# Patient Record
Sex: MALE | Race: WHITE | HISPANIC OR LATINO | Employment: UNEMPLOYED | URBAN - METROPOLITAN AREA
[De-identification: names, ages, dates, MRNs, and addresses within clinical notes are randomized per-mention and may not be internally consistent; named-entity substitution may affect disease eponyms.]

---

## 2020-08-26 ENCOUNTER — HOSPITAL ENCOUNTER (EMERGENCY)
Facility: HOSPITAL | Age: 25
Discharge: HOME | End: 2020-08-26
Attending: EMERGENCY MEDICINE
Payer: MEDICAID

## 2020-08-26 VITALS
RESPIRATION RATE: 19 BRPM | TEMPERATURE: 98 F | BODY MASS INDEX: 29.99 KG/M2 | HEART RATE: 75 BPM | DIASTOLIC BLOOD PRESSURE: 69 MMHG | WEIGHT: 180 LBS | SYSTOLIC BLOOD PRESSURE: 117 MMHG | HEIGHT: 65 IN | OXYGEN SATURATION: 100 %

## 2020-08-26 DIAGNOSIS — Z91.038 ALLERGIC REACTION TO INSECT BITE: Primary | ICD-10-CM

## 2020-08-26 PROCEDURE — 96375 TX/PRO/DX INJ NEW DRUG ADDON: CPT

## 2020-08-26 PROCEDURE — S0028 INJECTION, FAMOTIDINE, 20 MG: HCPCS | Performed by: EMERGENCY MEDICINE

## 2020-08-26 PROCEDURE — 63600175 PHARM REV CODE 636 W HCPCS: Performed by: EMERGENCY MEDICINE

## 2020-08-26 PROCEDURE — 99284 EMERGENCY DEPT VISIT MOD MDM: CPT | Mod: 25

## 2020-08-26 PROCEDURE — 25000003 PHARM REV CODE 250: Performed by: EMERGENCY MEDICINE

## 2020-08-26 PROCEDURE — 96374 THER/PROPH/DIAG INJ IV PUSH: CPT

## 2020-08-26 RX ORDER — CETIRIZINE HYDROCHLORIDE 10 MG/1
10 TABLET ORAL DAILY
Qty: 10 TABLET | Refills: 0 | Status: ON HOLD | OUTPATIENT
Start: 2020-08-26 | End: 2023-04-07 | Stop reason: HOSPADM

## 2020-08-26 RX ORDER — PREDNISONE 20 MG/1
40 TABLET ORAL DAILY
Qty: 6 TABLET | Refills: 0 | Status: ON HOLD | OUTPATIENT
Start: 2020-08-26 | End: 2023-04-07 | Stop reason: HOSPADM

## 2020-08-26 RX ORDER — DEXAMETHASONE SODIUM PHOSPHATE 4 MG/ML
8 INJECTION, SOLUTION INTRA-ARTICULAR; INTRALESIONAL; INTRAMUSCULAR; INTRAVENOUS; SOFT TISSUE
Status: COMPLETED | OUTPATIENT
Start: 2020-08-26 | End: 2020-08-26

## 2020-08-26 RX ORDER — FAMOTIDINE 10 MG/ML
20 INJECTION INTRAVENOUS ONCE
Status: COMPLETED | OUTPATIENT
Start: 2020-08-26 | End: 2020-08-26

## 2020-08-26 RX ADMIN — FAMOTIDINE 20 MG: 10 INJECTION, SOLUTION INTRAVENOUS at 09:08

## 2020-08-26 RX ADMIN — DEXAMETHASONE SODIUM PHOSPHATE 8 MG: 4 INJECTION, SOLUTION INTRAMUSCULAR; INTRAVENOUS at 08:08

## 2020-08-26 NOTE — ED NOTES
Patient stated that he was bite by ants around 8am and started to have reaction to ant bite. Patient stated that he noticied ant bites on his left arm and his left arm started to swell, with itching, and then his face and neck started to swell. Patient denies difficulty breathing with episode. Patient was driven to [employer] and took 50mg po of benadryl around 8:15 and then came here. Patient presents with no difficulty swallowing, no voice change and stated that he is starting to feel better. Patient denies itching at present time. Patient changed into gown

## 2020-08-26 NOTE — ED PROVIDER NOTES
Encounter Date: 8/26/2020       History     Chief Complaint   Patient presents with    Insect Bite     Pt presents to ED today c/o ants bites to hands, followed by vomiting, swelling to arms and itching to upper body. pt reports taking benadryl PTA. Pt denies feeling SOB or having difficulty swallowing. airway patent.      Patient is a 24-year-old male who said he was bitten by ants to his left arm this morning.  He began to notice his arm swelling which progressed on to his chest.  He also had an episode of vomiting.  He denies any difficulty breathing or swallowing.  Patient took 50 mg of Benadryl prior to arrival to the ED.        Review of patient's allergies indicates:  No Known Allergies  History reviewed. No pertinent past medical history.  No past surgical history on file.  History reviewed. No pertinent family history.  Social History     Tobacco Use    Smoking status: Not on file   Substance Use Topics    Alcohol use: Not on file    Drug use: Not on file     Review of Systems   Constitutional: Negative for fever.   Respiratory: Negative for cough and shortness of breath.    Cardiovascular: Negative for chest pain.   Gastrointestinal: Positive for nausea and vomiting.   Skin: Positive for color change.   All other systems reviewed and are negative.      Physical Exam     Initial Vitals   BP Pulse Resp Temp SpO2   08/26/20 0824 08/26/20 0823 08/26/20 0824 08/26/20 0820 08/26/20 0823   (!) 126/58 90 19 98 °F (36.7 °C) 95 %      MAP       --                Physical Exam    Nursing note and vitals reviewed.  Constitutional: No distress.   HENT:   Head: Atraumatic.   Eyes: EOM are normal. Pupils are equal, round, and reactive to light.   Neck: Neck supple.   Cardiovascular: Normal rate, regular rhythm and normal heart sounds.   Pulmonary/Chest: Breath sounds normal.   Abdominal: Soft. There is no abdominal tenderness.   Neurological: He is alert and oriented to person, place, and time.   Skin: Skin is warm  and dry.   There is diffuse erythema of the left arm extending to the upper chest and neck.  No visible pustules or vesicles.         ED Course   Procedures  Labs Reviewed - No data to display       Imaging Results    None          Medical Decision Making:   ED Management:  24-year-old male with an allergic reaction to antibiotics.  Patient was given 8 mg of Decadron here in the ED as well as 20 mg of Pepcid.  He has no respiratory compromise.  I will discharge him with prescriptions for Zyrtec and prednisone.  He will return if condition worsens otherwise try to follow-up with the primary physician when able.                                 Clinical Impression:       ICD-10-CM ICD-9-CM   1. Allergic reaction to insect bite  Z91.038 V15.06                                Hiram Pitts MD  08/26/20 1007

## 2022-03-29 ENCOUNTER — LAB VISIT (OUTPATIENT)
Dept: PRIMARY CARE CLINIC | Facility: OTHER | Age: 27
End: 2022-03-29
Payer: MEDICAID

## 2022-03-29 DIAGNOSIS — Z20.822 ENCOUNTER FOR LABORATORY TESTING FOR COVID-19 VIRUS: ICD-10-CM

## 2022-03-29 LAB
SARS-COV-2 RNA RESP QL NAA+PROBE: NOT DETECTED
SARS-COV-2- CYCLE NUMBER: NORMAL

## 2022-03-29 PROCEDURE — U0003 INFECTIOUS AGENT DETECTION BY NUCLEIC ACID (DNA OR RNA); SEVERE ACUTE RESPIRATORY SYNDROME CORONAVIRUS 2 (SARS-COV-2) (CORONAVIRUS DISEASE [COVID-19]), AMPLIFIED PROBE TECHNIQUE, MAKING USE OF HIGH THROUGHPUT TECHNOLOGIES AS DESCRIBED BY CMS-2020-01-R: HCPCS | Performed by: INTERNAL MEDICINE

## 2023-03-31 ENCOUNTER — HOSPITAL ENCOUNTER (EMERGENCY)
Facility: HOSPITAL | Age: 28
Discharge: PSYCHIATRIC OR SUBSTANCE USE FACILITY | End: 2023-03-31
Attending: EMERGENCY MEDICINE
Payer: MEDICAID

## 2023-03-31 VITALS
SYSTOLIC BLOOD PRESSURE: 149 MMHG | WEIGHT: 136 LBS | HEART RATE: 99 BPM | RESPIRATION RATE: 18 BRPM | DIASTOLIC BLOOD PRESSURE: 70 MMHG | BODY MASS INDEX: 22.66 KG/M2 | TEMPERATURE: 98 F | HEIGHT: 65 IN | OXYGEN SATURATION: 99 %

## 2023-03-31 DIAGNOSIS — R45.1 AGITATION: ICD-10-CM

## 2023-03-31 DIAGNOSIS — R45.851 SUICIDAL IDEATION: Primary | ICD-10-CM

## 2023-03-31 DIAGNOSIS — F15.10 AMPHETAMINE ABUSE: ICD-10-CM

## 2023-03-31 DIAGNOSIS — N34.2 URETHRITIS: ICD-10-CM

## 2023-03-31 DIAGNOSIS — F22 PARANOIA: ICD-10-CM

## 2023-03-31 LAB
ALBUMIN SERPL BCP-MCNC: 4.7 G/DL (ref 3.5–5.2)
ALP SERPL-CCNC: 90 U/L (ref 55–135)
ALT SERPL W/O P-5'-P-CCNC: 33 U/L (ref 10–44)
AMPHET+METHAMPHET UR QL: ABNORMAL
ANION GAP SERPL CALC-SCNC: 8 MMOL/L (ref 8–16)
APAP SERPL-MCNC: <3 UG/ML (ref 10–20)
AST SERPL-CCNC: 25 U/L (ref 10–40)
BACTERIA #/AREA URNS HPF: ABNORMAL /HPF
BARBITURATES UR QL SCN>200 NG/ML: NEGATIVE
BASOPHILS # BLD AUTO: 0.04 K/UL (ref 0–0.2)
BASOPHILS NFR BLD: 0.6 % (ref 0–1.9)
BENZODIAZ UR QL SCN>200 NG/ML: NEGATIVE
BILIRUB SERPL-MCNC: 0.4 MG/DL (ref 0.1–1)
BILIRUB UR QL STRIP: NEGATIVE
BUN SERPL-MCNC: 16 MG/DL (ref 6–20)
BZE UR QL SCN: NEGATIVE
CALCIUM SERPL-MCNC: 9.9 MG/DL (ref 8.7–10.5)
CANNABINOIDS UR QL SCN: NEGATIVE
CHLORIDE SERPL-SCNC: 107 MMOL/L (ref 95–110)
CLARITY UR: CLEAR
CO2 SERPL-SCNC: 22 MMOL/L (ref 23–29)
COLOR UR: YELLOW
CREAT SERPL-MCNC: 1 MG/DL (ref 0.5–1.4)
CREAT UR-MCNC: 305.2 MG/DL (ref 23–375)
DIFFERENTIAL METHOD: ABNORMAL
EOSINOPHIL # BLD AUTO: 0.1 K/UL (ref 0–0.5)
EOSINOPHIL NFR BLD: 0.9 % (ref 0–8)
ERYTHROCYTE [DISTWIDTH] IN BLOOD BY AUTOMATED COUNT: 12.3 % (ref 11.5–14.5)
EST. GFR  (NO RACE VARIABLE): >60 ML/MIN/1.73 M^2
ETHANOL SERPL-MCNC: <10 MG/DL
GLUCOSE SERPL-MCNC: 93 MG/DL (ref 70–110)
GLUCOSE UR QL STRIP: NEGATIVE
HCT VFR BLD AUTO: 46.8 % (ref 40–54)
HGB BLD-MCNC: 16.1 G/DL (ref 14–18)
HGB UR QL STRIP: ABNORMAL
HYALINE CASTS #/AREA URNS LPF: 5 /LPF
IMM GRANULOCYTES # BLD AUTO: 0.01 K/UL (ref 0–0.04)
IMM GRANULOCYTES NFR BLD AUTO: 0.1 % (ref 0–0.5)
KETONES UR QL STRIP: NEGATIVE
LEUKOCYTE ESTERASE UR QL STRIP: ABNORMAL
LYMPHOCYTES # BLD AUTO: 1.8 K/UL (ref 1–4.8)
LYMPHOCYTES NFR BLD: 25.3 % (ref 18–48)
MCH RBC QN AUTO: 29.5 PG (ref 27–31)
MCHC RBC AUTO-ENTMCNC: 34.4 G/DL (ref 32–36)
MCV RBC AUTO: 86 FL (ref 82–98)
METHADONE UR QL SCN>300 NG/ML: NEGATIVE
MICROSCOPIC COMMENT: ABNORMAL
MONOCYTES # BLD AUTO: 0.5 K/UL (ref 0.3–1)
MONOCYTES NFR BLD: 7.2 % (ref 4–15)
NEUTROPHILS # BLD AUTO: 4.6 K/UL (ref 1.8–7.7)
NEUTROPHILS NFR BLD: 65.9 % (ref 38–73)
NITRITE UR QL STRIP: NEGATIVE
NRBC BLD-RTO: 0 /100 WBC
OPIATES UR QL SCN: NEGATIVE
PCP UR QL SCN>25 NG/ML: NEGATIVE
PH UR STRIP: 6 [PH] (ref 5–8)
PLATELET # BLD AUTO: 330 K/UL (ref 150–450)
PMV BLD AUTO: 8.9 FL (ref 9.2–12.9)
POTASSIUM SERPL-SCNC: 4.2 MMOL/L (ref 3.5–5.1)
PROT SERPL-MCNC: 8.7 G/DL (ref 6–8.4)
PROT UR QL STRIP: ABNORMAL
RBC # BLD AUTO: 5.46 M/UL (ref 4.6–6.2)
RBC #/AREA URNS HPF: 6 /HPF (ref 0–4)
SODIUM SERPL-SCNC: 137 MMOL/L (ref 136–145)
SP GR UR STRIP: >1.03 (ref 1–1.03)
TOXICOLOGY INFORMATION: ABNORMAL
TSH SERPL DL<=0.005 MIU/L-ACNC: 3.02 UIU/ML (ref 0.4–4)
URN SPEC COLLECT METH UR: ABNORMAL
UROBILINOGEN UR STRIP-ACNC: ABNORMAL EU/DL
WBC # BLD AUTO: 6.91 K/UL (ref 3.9–12.7)
WBC #/AREA URNS HPF: 26 /HPF (ref 0–5)
WBC CLUMPS URNS QL MICRO: ABNORMAL

## 2023-03-31 PROCEDURE — 84443 ASSAY THYROID STIM HORMONE: CPT | Performed by: EMERGENCY MEDICINE

## 2023-03-31 PROCEDURE — 87591 N.GONORRHOEAE DNA AMP PROB: CPT | Performed by: EMERGENCY MEDICINE

## 2023-03-31 PROCEDURE — 96372 THER/PROPH/DIAG INJ SC/IM: CPT | Performed by: EMERGENCY MEDICINE

## 2023-03-31 PROCEDURE — 63600175 PHARM REV CODE 636 W HCPCS: Performed by: EMERGENCY MEDICINE

## 2023-03-31 PROCEDURE — 87086 URINE CULTURE/COLONY COUNT: CPT | Performed by: EMERGENCY MEDICINE

## 2023-03-31 PROCEDURE — 82077 ASSAY SPEC XCP UR&BREATH IA: CPT | Performed by: EMERGENCY MEDICINE

## 2023-03-31 PROCEDURE — 63600175 PHARM REV CODE 636 W HCPCS

## 2023-03-31 PROCEDURE — 25000003 PHARM REV CODE 250: Performed by: EMERGENCY MEDICINE

## 2023-03-31 PROCEDURE — 63700000 PHARM REV CODE 250 ALT 637 W/O HCPCS: Performed by: EMERGENCY MEDICINE

## 2023-03-31 PROCEDURE — 99285 EMERGENCY DEPT VISIT HI MDM: CPT | Mod: 25

## 2023-03-31 PROCEDURE — 81000 URINALYSIS NONAUTO W/SCOPE: CPT | Mod: 59 | Performed by: EMERGENCY MEDICINE

## 2023-03-31 PROCEDURE — 80307 DRUG TEST PRSMV CHEM ANLYZR: CPT | Performed by: EMERGENCY MEDICINE

## 2023-03-31 PROCEDURE — 80143 DRUG ASSAY ACETAMINOPHEN: CPT | Performed by: EMERGENCY MEDICINE

## 2023-03-31 PROCEDURE — 80053 COMPREHEN METABOLIC PANEL: CPT | Performed by: EMERGENCY MEDICINE

## 2023-03-31 PROCEDURE — 85025 COMPLETE CBC W/AUTO DIFF WBC: CPT | Performed by: EMERGENCY MEDICINE

## 2023-03-31 PROCEDURE — 96372 THER/PROPH/DIAG INJ SC/IM: CPT

## 2023-03-31 RX ORDER — LORAZEPAM 2 MG/ML
2 INJECTION INTRAMUSCULAR
Status: DISCONTINUED | OUTPATIENT
Start: 2023-03-31 | End: 2023-03-31

## 2023-03-31 RX ORDER — DIPHENHYDRAMINE HYDROCHLORIDE 50 MG/ML
INJECTION INTRAMUSCULAR; INTRAVENOUS
Status: COMPLETED
Start: 2023-03-31 | End: 2023-03-31

## 2023-03-31 RX ORDER — LORAZEPAM 0.5 MG/1
1 TABLET ORAL EVERY 4 HOURS PRN
Status: DISCONTINUED | OUTPATIENT
Start: 2023-03-31 | End: 2023-03-31 | Stop reason: HOSPADM

## 2023-03-31 RX ORDER — CEPHALEXIN 500 MG/1
500 CAPSULE ORAL EVERY 12 HOURS
Qty: 10 CAPSULE | Refills: 0 | Status: ON HOLD | OUTPATIENT
Start: 2023-03-31 | End: 2023-04-07 | Stop reason: HOSPADM

## 2023-03-31 RX ORDER — HALOPERIDOL 5 MG/ML
INJECTION INTRAMUSCULAR
Status: COMPLETED
Start: 2023-03-31 | End: 2023-03-31

## 2023-03-31 RX ORDER — CEFTRIAXONE 500 MG/1
500 INJECTION, POWDER, FOR SOLUTION INTRAMUSCULAR; INTRAVENOUS
Status: COMPLETED | OUTPATIENT
Start: 2023-03-31 | End: 2023-03-31

## 2023-03-31 RX ORDER — AZITHROMYCIN 250 MG/1
1000 TABLET, FILM COATED ORAL
Status: COMPLETED | OUTPATIENT
Start: 2023-03-31 | End: 2023-03-31

## 2023-03-31 RX ORDER — OLANZAPINE 5 MG/1
10 TABLET, ORALLY DISINTEGRATING ORAL
Status: COMPLETED | OUTPATIENT
Start: 2023-03-31 | End: 2023-03-31

## 2023-03-31 RX ORDER — LORAZEPAM 2 MG/ML
INJECTION INTRAMUSCULAR
Status: DISCONTINUED
Start: 2023-03-31 | End: 2023-03-31 | Stop reason: HOSPADM

## 2023-03-31 RX ORDER — LORAZEPAM 2 MG/ML
2 INJECTION INTRAMUSCULAR EVERY 4 HOURS PRN
Status: DISCONTINUED | OUTPATIENT
Start: 2023-03-31 | End: 2023-03-31

## 2023-03-31 RX ORDER — LORAZEPAM 2 MG/ML
2 INJECTION INTRAMUSCULAR
Status: COMPLETED | OUTPATIENT
Start: 2023-03-31 | End: 2023-03-31

## 2023-03-31 RX ORDER — HALOPERIDOL 5 MG/ML
5 INJECTION INTRAMUSCULAR
Status: COMPLETED | OUTPATIENT
Start: 2023-03-31 | End: 2023-03-31

## 2023-03-31 RX ORDER — DIPHENHYDRAMINE HYDROCHLORIDE 50 MG/ML
50 INJECTION INTRAMUSCULAR; INTRAVENOUS
Status: COMPLETED | OUTPATIENT
Start: 2023-03-31 | End: 2023-03-31

## 2023-03-31 RX ORDER — LORAZEPAM 2 MG/ML
2 INJECTION INTRAMUSCULAR
Status: DISCONTINUED | OUTPATIENT
Start: 2023-03-31 | End: 2023-03-31 | Stop reason: HOSPADM

## 2023-03-31 RX ADMIN — DIPHENHYDRAMINE HYDROCHLORIDE 50 MG: 50 INJECTION INTRAMUSCULAR; INTRAVENOUS at 04:03

## 2023-03-31 RX ADMIN — HALOPERIDOL LACTATE 5 MG: 5 INJECTION, SOLUTION INTRAMUSCULAR at 04:03

## 2023-03-31 RX ADMIN — HALOPERIDOL 5 MG: 5 INJECTION INTRAMUSCULAR at 04:03

## 2023-03-31 RX ADMIN — CEFTRIAXONE SODIUM 500 MG: 500 INJECTION, POWDER, FOR SOLUTION INTRAMUSCULAR; INTRAVENOUS at 09:03

## 2023-03-31 RX ADMIN — AZITHROMYCIN MONOHYDRATE 1000 MG: 250 TABLET ORAL at 09:03

## 2023-03-31 RX ADMIN — OLANZAPINE 10 MG: 5 TABLET, ORALLY DISINTEGRATING ORAL at 09:03

## 2023-03-31 RX ADMIN — DIPHENHYDRAMINE HYDROCHLORIDE 50 MG: 50 INJECTION, SOLUTION INTRAMUSCULAR; INTRAVENOUS at 04:03

## 2023-03-31 RX ADMIN — LORAZEPAM 2 MG: 2 INJECTION INTRAMUSCULAR; INTRAVENOUS at 04:03

## 2023-03-31 NOTE — ED NOTES
Unsuccessful attempt  to put pt in restraints. Pt up, yelling and banging on door. Security at bedside.

## 2023-03-31 NOTE — ED NOTES
Patient escorted out by security and tech to transported via [facility] to be transferred to [facility].  Patient is accompanied by his personal belongings bag.

## 2023-03-31 NOTE — ED NOTES
Security at BS. Pt complaining but is being compliant. Pt took meds, is in blue scrubs. Pt notified he will be going to [facility]. When asked if he wanted anyone to be notified pt mumbled and fell back asleep.

## 2023-03-31 NOTE — ED PROVIDER NOTES
"Encounter Date: 3/31/2023    SCRIBE #1 NOTE: I, Jair Palacio, am scribing for, and in the presence of,  Kp Camargo MD. I have scribed the following portions of the note - Other sections scribed: HPI, ROS, PE, procedure note.     History     Chief Complaint   Patient presents with    Psychiatric Evaluation     Pt presents to the ED via police. States he was having an argument with his girlfriend when she called the police and told them he wanted to kill himself. Pt is adamant that he has no SI or HI.      Abdirahman Yang is a 27 y.o male with no pertinent PMHx, who presents to the ED via police for psychiatric evaluation. History provided by independent historian, police, who reports patient had an argument with his girlfriend when he endorsed being suicidal, during which she called the police. Patient currently denies any SI. Patient states "she sent me to the hospital because she wants to go meet someone else." No medications administered PTA. No alleviating or exacerbating factors noted. NKDA.    The history is provided by the police and the patient. History limited by: Patient aggressive and agitated. No  was used.   Review of patient's allergies indicates:  No Known Allergies  No past medical history on file.  No past surgical history on file.  No family history on file.     Review of Systems   Unable to perform ROS: Psychiatric disorder (Patient aggressive and agitated.)   Psychiatric/Behavioral:  Positive for suicidal ideas.      Physical Exam     Initial Vitals [03/31/23 0332]   BP Pulse Resp Temp SpO2   (!) 153/96 107 20 97.6 °F (36.4 °C) 97 %      MAP       --         Physical exam performed by door due to patient being aggressive and agitated.    Physical Exam    Nursing note and vitals reviewed.  Constitutional: He appears well-developed and well-nourished.   HENT:   Head: Normocephalic and atraumatic.   Pulmonary/Chest: Effort normal. No tachypnea.   Abdominal: Abdomen is flat. "     Neurological: He is alert.   Moving all extremities spontaneously.   Psychiatric: His affect is angry. He is aggressive, hyperactive and combative. He expresses suicidal ideation. He expresses no suicidal plans.   Appears aggressive, angry, and agitated.        ED Course   Critical Care    Date/Time: 3/31/2023 4:31 AM  Performed by: Kp Camargo MD  Authorized by: Kp Camargo MD   Direct patient critical care time: 6 minutes  Additional history critical care time: 6 minutes  Ordering / reviewing critical care time: 6 minutes  Documentation critical care time: 6 minutes  Consulting other physicians critical care time: 6 minutes  Consult with family critical care time: 5 minutes  Other critical care time: 5 minutes  Total critical care time (exclusive of procedural time) : 40 minutes  Critical care time was exclusive of teaching time and separately billable procedures and treating other patients.  Critical care was necessary to treat or prevent imminent or life-threatening deterioration of the following conditions: toxidrome (psychiatric agitation).  Critical care was time spent personally by me on the following activities: re-evaluation of patient's condition, development of treatment plan with patient or surrogate, discussions with consultants, evaluation of patient's response to treatment, examination of patient, obtaining history from patient or surrogate, ordering and performing treatments and interventions and ordering and review of laboratory studies.  Comments: Treatment with chemical sedation.      Labs Reviewed   CBC W/ AUTO DIFFERENTIAL - Abnormal; Notable for the following components:       Result Value    MPV 8.9 (*)     All other components within normal limits   COMPREHENSIVE METABOLIC PANEL - Abnormal; Notable for the following components:    CO2 22 (*)     Total Protein 8.7 (*)     All other components within normal limits   ACETAMINOPHEN LEVEL - Abnormal; Notable for the following  components:    Acetaminophen (Tylenol), Serum <3.0 (*)     All other components within normal limits   TSH   ALCOHOL,MEDICAL (ETHANOL)   URINALYSIS, REFLEX TO URINE CULTURE   DRUG SCREEN PANEL, URINE EMERGENCY   URINALYSIS MICROSCOPIC          Imaging Results    None          Medications   LORazepam injection 2 mg (has no administration in time range)   LORazepam injection 2 mg (2 mg Intramuscular Not Given 3/31/23 0415)   haloperidol lactate injection 5 mg (5 mg Intramuscular Given 3/31/23 0404)   diphenhydrAMINE injection 50 mg (50 mg Intramuscular Given 3/31/23 0405)   LORazepam injection 2 mg (2 mg Intramuscular Given 3/31/23 0406)     Medical Decision Making:   History:   Old Medical Records: I decided to obtain old medical records.  Initial Assessment:   27-year-old male presenting initially for possible suicidal ideation per the request of his girlfriend.  Per report of the girlfriend patient had made multiple verbal threats to hurt himself on her attempted break-up with him.  She also reports increased paranoia and and the patient believing people are after him.  She has concerns patient is using recreational drugs.  On exam patient is severely agitated hyperactive and aggressive.  Patient received sedating medication to prevent self-harm and harming others.  Clinical Tests:   Lab Tests: Reviewed and Ordered  Radiological Study: Reviewed and Ordered  Medical Tests: Ordered and Reviewed  ED Management:    Problems considered includes agitation, possible suicidal ideation, paranoia (Signs & symptoms, differentials)  Chronic problems impacting care includes none.  Additional history obtained from independent historians. Details includes police and girlfriend as patient appeared to agitated (EMS, police, family, nursing home)        Plan was discussed with the patient.  This includes plan for pec.     All questions or concerns have been addressed.        Scribe Attestation:   Scribe #1: I performed the above  scribed service and the documentation accurately describes the services I performed. I attest to the accuracy of the note.      ED Course as of 03/31/23 0727   Fri Mar 31, 2023   0350 After patient was told that he was going to receive sedation he is now cooperative. Patient is willing to get changed. We will obtain the details of what occurred prior.  [JM]   0353 Call to LYNN Olivo. Girlfriend    Paranoid that people are always watching him, concerned about potentially doing drugs but clear.  She states every time she attempts to break up with him he states that he is going to kill himself.  I discussed that we are likely going to keep the patient as he appears agitated with concern for possible SI and paranoia. [JM]   0505 Patient is medically clear. [JM]      ED Course User Index  [JM] Kp Camargo MD                   Clinical Impression:   Final diagnoses:  [R45.1] Agitation (Primary)  [R45.851] Suicidal ideation  [F22] Paranoia              I, Kp Caamrgo, personally performed the services described in this documentation. All medical record entries made by the scribe were at my direction and in my presence. I have reviewed the chart and agree that the record reflects my personal performance and is accurate and complete.       Kp Camargo MD  03/31/23 0729

## 2023-03-31 NOTE — DISCHARGE INSTRUCTIONS
[facility] [phone], After hours, nights, and weekends, you can reach a primary care on-call provider at [phone] and a behavioral health mobile crisis line at [phone]:  [facility]  [address]  [address] [place], [place] [address]INGRID moore  [address] [phone]  Hours: Monday - Friday  7:00 a.m. - 5:00 p.m.          MENTAL HEALTH/ADDICTIVE DISORDERS  REFERRAL RECOMMENDATIONS    - AA ([phone]) [email or web address] or NA ([phone])    - [facility] Intensive Outpatient Program [phone], option 2    - Places for Outpatient Addictive Disorders and Mental Health Treatment in [facility]:    [facility] ([place]; accepts Medicaid, commercial insurance) [phone]    [facility] ([place]) [phone], [address] [facility] [phone]; Crisis [phone] - Call for options A-E:    ? [facility], [address] [place] [address]    ? [facility] [place], [address] [facility] [place] [address]    ? [facility], [address] [place] [address],    ? [facility], 2nd floor [address] [place] [address]    ? [facility] [place], [address], [place] [address]    ? [facility] [place], [place] [address]    [facility], [address] [facility], [phone]    [facility], [place] [phone]    [facility] (Mental & General), [address] [facility], [phone]    [facility] (Mental & General Health, not only HIV+, 3 [place]NNIE locations) [phone]    [facility], [address] [facility] [place], [address], [phone]     [facility], [address] [place] [address] [place], [phone], [phone]    Behavioral Health  Group (Methadone Maintenance)    ? [address] [place] [address], [phone]    ? [place] [address] [phone]    - Addiction and Mental Health Treatment in Other [facility]:    [facility], [address] [place], [phone]    [facility], [phone], [address] [facility], [address], [phone]    [facility]. [address] [facility], [phone]    [facility], [address] [facility], [phone]    [facility]    ? [facility], [address] [facility], [phone] ([facility])    ? [facility], [address] [facility], [phone] ([facility])    ? [facility], [address] [facility], [address], [place], [phone] ([place], [place], and [facility])    ? [facility], [address] [place], [phone] ([facility])    [facility] Hotline [phone], [phone]    ? [facility], [address] [facility]    ? [facility], [address] [facility], [place]    ? [facility], [address] [facility], [place]    ? [facility], [address] [facility] [address] [place]    ? [facility], [address], [place]    [facility], [address], [place] [phone]    [facility], [address] [facility] [phone]    [facility], [address] [facility], [phone]    [facility], [address] [facility], [phone]    - Residential Addictive Disorders Treatment (call every day until you get in):    [facility] [address] [facility], [phone]    [facility] (men only) [address], [phone]    [facility], [address] [facility], mens program [phone], womens program [phone]    [facility], [address] [facility], [phone]    [facility] (Female only) [address] [facility], [phone]    [facility] (IOP, residential, low cost, MCaid) [address], [phone]    [facility] (Men only, [phone]), [address] [facility], [place]    [facility] (Pregnant/women with or without children, [phone])    [facility] (Women only), [address] [facility], [phone]    [facility] (men only), [place] [phone]    - Inpatient Rehabs (out of area)    [place], [place], [place], [phone]     [facility], [phone]    [facility], [phone]    [place], [place] [phone])    [facility] ([place]) [phone]    *In case of suicidal thinking, return to ED and/or call or text the COPE LINE at [phone]*

## 2023-03-31 NOTE — PROVIDER PROGRESS NOTES - EMERGENCY DEPT.
Encounter Date: 3/31/2023    ED Physician Progress Notes        Physician Note:   26yo male who presented prior to my arrival. Reportedly suicidal. Came in [facility] requiring physical and then chemical restraints for the safety of staff and the patient. Pt able to be awoken at this time. Still calm and sedated.  Medically cleared for psychiatric placement. UA performed by cath. Shows 26 WBC. No discharge noted. Normal penis and testicles. Pt uncircumcised. Two dermoid cysts to scrotal wall without any evidence of infection. Will give Rocephin and azithromycin and prescribe 5 days of keflex.

## 2023-03-31 NOTE — ED TRIAGE NOTES
Pt presents to ED via police. Pt states he had an argument with his girlfriend  and that she was trying to sleep with other men so she called the police and said that he wants to kill himself. Pt denies suicidal ideations. Pt is alert, aggressive, and yelling.

## 2023-04-01 PROBLEM — R03.0 ELEVATED BP WITHOUT DIAGNOSIS OF HYPERTENSION: Status: ACTIVE | Noted: 2023-04-01

## 2023-04-01 PROBLEM — R82.90 ABNORMAL URINALYSIS: Status: ACTIVE | Noted: 2023-04-01

## 2023-04-01 PROBLEM — N30.00 ACUTE CYSTITIS WITHOUT HEMATURIA: Status: ACTIVE | Noted: 2023-04-01

## 2023-04-02 LAB — BACTERIA UR CULT: NORMAL

## 2023-04-04 LAB
C TRACH DNA SPEC QL NAA+PROBE: DETECTED
N GONORRHOEA DNA SPEC QL NAA+PROBE: DETECTED

## 2023-04-07 PROBLEM — F25.8 OTHER SCHIZOAFFECTIVE DISORDERS: Status: ACTIVE | Noted: 2023-04-07

## 2023-04-07 PROBLEM — F15.20 AMPHETAMINE USE DISORDER, SEVERE: Status: ACTIVE | Noted: 2023-04-07

## 2024-01-17 ENCOUNTER — HOSPITAL ENCOUNTER (EMERGENCY)
Facility: HOSPITAL | Age: 29
Discharge: PSYCHIATRIC OR SUBSTANCE USE FACILITY | End: 2024-01-17
Attending: EMERGENCY MEDICINE
Payer: MEDICAID

## 2024-01-17 VITALS
OXYGEN SATURATION: 99 % | TEMPERATURE: 99 F | HEART RATE: 90 BPM | DIASTOLIC BLOOD PRESSURE: 68 MMHG | SYSTOLIC BLOOD PRESSURE: 104 MMHG | RESPIRATION RATE: 16 BRPM

## 2024-01-17 DIAGNOSIS — F15.950 AMPHETAMINE AND PSYCHOSTIMULANT-INDUCED PSYCHOSIS WITH DELUSIONS: Primary | ICD-10-CM

## 2024-01-17 LAB
ALBUMIN SERPL BCP-MCNC: 4.4 G/DL (ref 3.5–5.2)
ALP SERPL-CCNC: 76 U/L (ref 55–135)
ALT SERPL W/O P-5'-P-CCNC: 27 U/L (ref 10–44)
AMPHET+METHAMPHET UR QL: ABNORMAL
ANION GAP SERPL CALC-SCNC: 11 MMOL/L (ref 8–16)
APAP SERPL-MCNC: <3 UG/ML (ref 10–20)
AST SERPL-CCNC: 17 U/L (ref 10–40)
BACTERIA #/AREA URNS HPF: NORMAL /HPF
BARBITURATES UR QL SCN>200 NG/ML: NEGATIVE
BASOPHILS # BLD AUTO: 0.03 K/UL (ref 0–0.2)
BASOPHILS NFR BLD: 0.4 % (ref 0–1.9)
BENZODIAZ UR QL SCN>200 NG/ML: NEGATIVE
BILIRUB SERPL-MCNC: 0.6 MG/DL (ref 0.1–1)
BILIRUB UR QL STRIP: NEGATIVE
BUN SERPL-MCNC: 13 MG/DL (ref 6–20)
BZE UR QL SCN: NEGATIVE
CALCIUM SERPL-MCNC: 9.8 MG/DL (ref 8.7–10.5)
CANNABINOIDS UR QL SCN: NEGATIVE
CHLORIDE SERPL-SCNC: 104 MMOL/L (ref 95–110)
CLARITY UR: CLEAR
CO2 SERPL-SCNC: 26 MMOL/L (ref 23–29)
COLOR UR: YELLOW
CREAT SERPL-MCNC: 1 MG/DL (ref 0.5–1.4)
CREAT UR-MCNC: 291.2 MG/DL (ref 23–375)
DIFFERENTIAL METHOD BLD: NORMAL
EOSINOPHIL # BLD AUTO: 0.1 K/UL (ref 0–0.5)
EOSINOPHIL NFR BLD: 1.4 % (ref 0–8)
ERYTHROCYTE [DISTWIDTH] IN BLOOD BY AUTOMATED COUNT: 12.8 % (ref 11.5–14.5)
EST. GFR  (NO RACE VARIABLE): >60 ML/MIN/1.73 M^2
ETHANOL SERPL-MCNC: <10 MG/DL
GLUCOSE SERPL-MCNC: 98 MG/DL (ref 70–110)
GLUCOSE UR QL STRIP: NEGATIVE
HCT VFR BLD AUTO: 42 % (ref 40–54)
HGB BLD-MCNC: 14.2 G/DL (ref 14–18)
HGB UR QL STRIP: NEGATIVE
HYALINE CASTS #/AREA URNS LPF: 0 /LPF
IMM GRANULOCYTES # BLD AUTO: 0.02 K/UL (ref 0–0.04)
IMM GRANULOCYTES NFR BLD AUTO: 0.3 % (ref 0–0.5)
KETONES UR QL STRIP: NEGATIVE
LEUKOCYTE ESTERASE UR QL STRIP: NEGATIVE
LYMPHOCYTES # BLD AUTO: 1.4 K/UL (ref 1–4.8)
LYMPHOCYTES NFR BLD: 20.2 % (ref 18–48)
MCH RBC QN AUTO: 28.9 PG (ref 27–31)
MCHC RBC AUTO-ENTMCNC: 33.8 G/DL (ref 32–36)
MCV RBC AUTO: 86 FL (ref 82–98)
METHADONE UR QL SCN>300 NG/ML: NEGATIVE
MICROSCOPIC COMMENT: NORMAL
MONOCYTES # BLD AUTO: 0.7 K/UL (ref 0.3–1)
MONOCYTES NFR BLD: 9.8 % (ref 4–15)
NEUTROPHILS # BLD AUTO: 4.8 K/UL (ref 1.8–7.7)
NEUTROPHILS NFR BLD: 67.9 % (ref 38–73)
NITRITE UR QL STRIP: NEGATIVE
NRBC BLD-RTO: 0 /100 WBC
OPIATES UR QL SCN: NEGATIVE
PCP UR QL SCN>25 NG/ML: NEGATIVE
PH UR STRIP: 6 [PH] (ref 5–8)
PLATELET # BLD AUTO: 306 K/UL (ref 150–450)
PMV BLD AUTO: 9.2 FL (ref 9.2–12.9)
POTASSIUM SERPL-SCNC: 3.7 MMOL/L (ref 3.5–5.1)
PROT SERPL-MCNC: 7.6 G/DL (ref 6–8.4)
PROT UR QL STRIP: ABNORMAL
RBC # BLD AUTO: 4.91 M/UL (ref 4.6–6.2)
RBC #/AREA URNS HPF: 1 /HPF (ref 0–4)
SODIUM SERPL-SCNC: 141 MMOL/L (ref 136–145)
SP GR UR STRIP: >1.03 (ref 1–1.03)
SQUAMOUS #/AREA URNS HPF: 0 /HPF
TOXICOLOGY INFORMATION: ABNORMAL
URN SPEC COLLECT METH UR: ABNORMAL
UROBILINOGEN UR STRIP-ACNC: ABNORMAL EU/DL
WBC # BLD AUTO: 7.04 K/UL (ref 3.9–12.7)
WBC #/AREA URNS HPF: 2 /HPF (ref 0–5)

## 2024-01-17 PROCEDURE — 80307 DRUG TEST PRSMV CHEM ANLYZR: CPT | Performed by: EMERGENCY MEDICINE

## 2024-01-17 PROCEDURE — 82077 ASSAY SPEC XCP UR&BREATH IA: CPT | Performed by: EMERGENCY MEDICINE

## 2024-01-17 PROCEDURE — 80143 DRUG ASSAY ACETAMINOPHEN: CPT | Performed by: EMERGENCY MEDICINE

## 2024-01-17 PROCEDURE — 99285 EMERGENCY DEPT VISIT HI MDM: CPT

## 2024-01-17 PROCEDURE — 99205 OFFICE O/P NEW HI 60 MIN: CPT | Mod: AF,HB,, | Performed by: PSYCHIATRY & NEUROLOGY

## 2024-01-17 PROCEDURE — 90833 PSYTX W PT W E/M 30 MIN: CPT | Mod: AF,HB,, | Performed by: PSYCHIATRY & NEUROLOGY

## 2024-01-17 PROCEDURE — 85025 COMPLETE CBC W/AUTO DIFF WBC: CPT | Performed by: EMERGENCY MEDICINE

## 2024-01-17 PROCEDURE — 80053 COMPREHEN METABOLIC PANEL: CPT | Performed by: EMERGENCY MEDICINE

## 2024-01-17 PROCEDURE — 81000 URINALYSIS NONAUTO W/SCOPE: CPT | Mod: 59 | Performed by: EMERGENCY MEDICINE

## 2024-01-17 NOTE — ED NOTES
Pt walks to restroom without any assistance to provide urine sample. Pt now resting comfortably in bed in no apparent distress. 1:1 safety sitter present with pt performing q15 min safety checks. All potentially harmful items are removed from room. Pt alert and oriented.

## 2024-01-17 NOTE — ED NOTES
"Pt informed of urine sample needed for urinalysis, water provided by RN patient advise to drink water to be able to void. pt states "I can't pee, tell the nurse to put the catheter in like she told me earlier, even if I drink water I wont be able to pee" RN Notified.  "

## 2024-01-17 NOTE — ED PROVIDER NOTES
Encounter Date: 1/17/2024       History     Chief Complaint   Patient presents with    Mental Health Problem     EMS reports that [facility] was called to the [facility] b/c patient believed he was being followed. On arrival, patient is awake, alert. Denies SI/HI. Oriented x 3. Denies ETOH or drugs other than THC. No evidence trauma.     28-year-old male brought to the emergency department by EMS [facility] where [facility] was called due to bizarre behavior.  Patient states someone is following him and trying to harm him.  He is unsure the name of the person.  States he has been tracking him.  Denies any suicidal or homicidal ideation.  States he has not taking any prescribed medications at all at this time.  No somatic complaints reported.      Review of patient's allergies indicates:  No Known Allergies  History reviewed. No pertinent past medical history.  History reviewed. No pertinent surgical history.  History reviewed. No pertinent family history.  Social History     Tobacco Use    Smoking status: Never    Smokeless tobacco: Never   Substance Use Topics    Drug use: Yes     Types: Amphetamines     Review of Systems   Constitutional:  Negative for chills and fever.   HENT:  Negative for congestion.    Respiratory:  Negative for cough and shortness of breath.    Cardiovascular:  Negative for chest pain.   Gastrointestinal:  Negative for abdominal pain.   Musculoskeletal:  Negative for back pain.   Neurological:  Negative for light-headedness and headaches.       Physical Exam     Initial Vitals [01/17/24 1121]   BP Pulse Resp Temp SpO2   136/81 82 18 98.1 °F (36.7 °C) 100 %      MAP       --         Physical Exam    Nursing note and vitals reviewed.  Constitutional: He appears well-developed and well-nourished. No distress.   HENT:   Head: Normocephalic and atraumatic.   Eyes: Conjunctivae and EOM are normal. Pupils are equal, round, and reactive to light.   Neck: Neck supple. No tracheal deviation present.    Normal range of motion.  Cardiovascular:  Normal rate and intact distal pulses.           Pulmonary/Chest: No respiratory distress.   Musculoskeletal:         General: No tenderness or edema. Normal range of motion.      Cervical back: Normal range of motion and neck supple.     Neurological: He is alert and oriented to person, place, and time. He has normal strength. No cranial nerve deficit. GCS score is 15. GCS eye subscore is 4. GCS verbal subscore is 5. GCS motor subscore is 6.   Skin: Skin is warm and dry.         ED Course   Procedures  Labs Reviewed   DRUG SCREEN PANEL, URINE EMERGENCY - Abnormal; Notable for the following components:       Result Value    Amphetamine Screen, Ur Presumptive Positive (*)     All other components within normal limits    Narrative:     Specimen Source->Urine   ACETAMINOPHEN LEVEL - Abnormal; Notable for the following components:    Acetaminophen (Tylenol), Serum <3.0 (*)     All other components within normal limits   URINALYSIS - Abnormal; Notable for the following components:    Specific Gravity, UA >1.030 (*)     Protein, UA 1+ (*)     Urobilinogen, UA 2.0-3.0 (*)     All other components within normal limits    Narrative:     Specimen Source->Urine   CBC W/ AUTO DIFFERENTIAL   COMPREHENSIVE METABOLIC PANEL   ALCOHOL,MEDICAL (ETHANOL)   URINALYSIS MICROSCOPIC    Narrative:     Specimen Source->Urine          Imaging Results    None          Medications - No data to display  Medical Decision Making  28-year-old male presents to the emergency department by EMS for evaluation of possible paranoia      Differential: Toxidrome, overdose, withdrawal, psychosis      Given patient's presentation and past medical history I have placed a tele psych consult to assist in evaluation of this patient.      Discussed with Dr. Harper with psychiatry who recommends pec and transferred to accepting psychiatric facility for treatment of amphetamine induced psychosis.  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     Details: Reviewed remote psychiatry note documenting baseline medications and past medical history  Labs: ordered.     Details: CBC without leukocytosis, normal H&H; CMP with normal renal and liver function tests, normal electrolytes; ethanol negative; Tylenol negative;  Discussion of management or test interpretation with external provider(s): Discussed with Dr. Harper with psychiatry, reviewed patient's past medical history, presentation, physical exam, lab results, plan to pec    Risk  Decision regarding hospitalization.                  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

## 2024-01-17 NOTE — CONSULTS
PSYCHIATRY INPATIENT CONSULT NOTE      1/17/2024 12:30 PM   Abdirahman Yang   1995   [id number]           DATE OF ADMISSION: 1/17/2024 11:04 AM    SITE: [facility]    HISTORY    Per ED MD:  Patient presents with    Mental Health Problem       EMS reports that [facility] was called to the [facility] b/c patient believed he was being followed. On arrival, patient is awake, alert. Denies SI/HI. Oriented x 3. Denies ETOH or drugs other than THC. No evidence trauma.   28-year-old male brought to the emergency department by EMS [place] [facility] was called due to bizarre behavior.  Patient states someone is following him and trying to harm him.  He is unsure the name of the person.  States he has been tracking him.  Denies any suicidal or homicidal ideation.  States he has not taking any prescribed medications at all at this time.  No somatic complaints reported.      Chief Complaint / Reason for Psychiatry Consult: Paranoia       HPI:   Abdirahman Yang is a 28 y.o. male with a past medical history as noted above/below and a past psychiatric history of Schizoaffective Disorder vs SIPD/SIMD, Amphetamine Use Disorder, and Cannabis Abuse, currently in the ED as noted above.  Psychiatry was originally consulted as noted above.  The patient was seen and examined.  The chart was reviewed.  On examination today, the patient was alert and oriented to person, place, city, state, month, year, and somewhat to situation.  He was CAM-ICU negative for delirium.  He endorses worsening paranoid delusional TC (that people are trying to follow him and kill him) and delusion-congruent VH (of men dressed in black) over the past few days (in the context of IV crystal meth abuse and cannabis abuse).  He has poor insight into his mental illness / substance abuse issues.  He denies any current or recent active or passive SI / HI.  He does endorses issues with anxiety and depression symptoms as noted below in the detailed psych  ROS.  He also endorses not sleep and not eating over the past few days due to worry / paranoia about people coming to kill him.  He denies any AH ot TH (no RIS observed on exam).  He was previously on Risperdal, Cogentin, and Depakote, but he states that he hasn't taken any psychiatric medications in several months.  He denies any current medical/physical complaints at this time.  NAD was observed during the examination.  Psychotherapy was implemented as noted below with a focus on improving psychosis (paranoid delusional TC and VH), depression, anxiety, sleep, and polysubstance cessation / total sobriety.  See detailed psych ROS below.  See A/P below.        Psychiatric Review Of Systems - Currently, the patient is endorsing and/or denying the following:  (patient's endorsements are BOLDED below; if not BOLDED, then patient denied):    Endorses Symptoms of Depression: diminished mood, low motivation, loss of interest/anhedonia, irritability, diminished energy, change in sleep, change in appetite, diminished concentration or cognition or indecisiveness, PMA/R, excessive guilt or hopelessness or worthlessness, suicidal ideations    Endorses trouble with Sleep: initiation, maintenance, early morning awakening with inability to return to sleep    Denies Suicidal/Homicidal ideations: active/passive ideations, organized plans, future intentions    Endorses Symptoms of psychosis: visual hallucinations, paranoid delusions, auditory hallucinations, disorganized thinking, disorganized behavior or abnormal motor behavior, or negative symptoms (diminshed emotional expression, avolition, anhedonia, alogia, asociality)     Denies Symptoms of charu or hypomania: elevated, expansive, or irritable mood with increased energy or activity; with inflated self-esteem or grandiosity, decreased need for sleep, increased rate of speech, FOI or racing thoughts, distractibility, increased goal directed activity or PMA, risky/disinhibited  behavior    Endorses Symptoms of Anxiety: excessive anxiety/worry/fear, more days than not, about numerous issues, difficult to control, with restlessness, fatigue, poor concentration, irritability, muscle tension, sleep disturbance; causes functionally impairing distress     Denies Symptoms of Panic Disorder: recurrent panic attacks, precipitated or un-precipitated, source of worry and/or behavioral changes secondary; with or without agoraphobia    Denies Symptoms of PTSD: h/o trauma; re-experiencing/intrusive symptoms, avoidant behavior, negative alterations in cognition or mood, or hyperarousal symptoms; with or without dissociative symptoms     Denies Symptoms of OCD: obsessions or compulsions     Denies Symptoms of Eating Disorders: anorexia, bulimia or binging    Endorses Substance Use (IV Crystal Meth and Cannabis): intoxication, withdrawal, tolerance, used in larger amounts or duration than intended, unsuccessful attempts to limit or quit, increased time engaging in or seeking out, cravings or strong desire to use, failure to fulfill obligations, negative consequences in social/interpersonal/occupational,/recreational areas, use in dangerous situations, medical or psychological consequences       [facility] Toolkit ASQ Suicide Screening Tool:  In the past few weeks, have you wished you were dead? Denies   In the past few weeks, have you felt that you or your family would be better off if you were dead? Denies  In the past week, have you been having thoughts about killing yourself? Denies  Have you ever tried to kill yourself? Yes, in 2016 via jumping in front of a moving vehicle   Are you having thoughts of killing yourself right now? Denies       PSYCHOTHERAPY ADD-ON [id number]   30 (16-37*) minutes    Time: 20 minutes  Participants: Met with patient    Therapeutic Intervention Type: behavior modifying psychotherapy, supportive psychotherapy  Why chosen therapy is appropriate versus another modality: relevant to  diagnosis, patient responds to this modality, evidence based practice    Target symptoms: psychosis (paranoid delusional TC and VH), depression, anxiety, insomnia, and polysubstance abuse (crystal meth and cannabis)  Primary focus: improving psychosis (paranoid delusional TC and VH), depression, anxiety, sleep, and polysubstance cessation / total sobriety   Psychotherapeutic techniques: supportive and psychodynamic techniques; psycho-education; deep breathing exercises; motivational interviewing; reality / insight orientation; CBT; problem solving techniques and managing life stressors    Outcome monitoring methods: self-report, observation    Patient's response to intervention:  The patient's response to intervention is accepting but guarded.    Progress toward goals:  The patient's progress toward goals is limited.      ROS:  General ROS: negative for - chills, fatigue, fever or night sweats  Ophthalmic ROS: negative for - blurry vision, double vision or eye pain  ENT ROS: negative for - sinus pain, headaches, sore throat or visual changes  Allergy and Immunology ROS: negative for - hives, itchy/watery eyes or nasal congestion  Hematological and Lymphatic ROS: negative for - bleeding problems, bruising, jaundice or pallor  Endocrine ROS: negative for - galactorrhea, hot flashes, mood swings, palpitations or temperature intolerance  Respiratory ROS: negative for - cough, hemoptysis, shortness of breath, tachypnea or wheezing  Cardiovascular ROS: negative for - chest pain, dyspnea on exertion, loss of consciousness, palpitations, rapid heart rate or shortness of breath  Gastrointestinal ROS: negative for - appetite loss, nausea, abdominal pain, blood in stools, change in bowel habits, constipation or diarrhea  Genito-Urinary ROS: negative for - incontinence, nocturia or pelvic pain  Musculoskeletal ROS: negative for - joint stiffness, joint swelling, joint pain or muscle pain   Neurological ROS: negative for -  behavioral changes, confusion, dizziness, memory loss, numbness/tingling or seizures  Dermatological ROS: negative for dry skin, hair changes, pruritus or rash  Psychiatric ROS: see detailed psychiatric ROS above in history section       Past Psychiatric History:  Previous Diagnoses: Schizoaffective Disorder vs SIPD/SIMD, Amphetamine Use Disorder, and Cannabis Abuse   Previous Medication Trials: Risperdal, Cogentin, and Depakote   Previous Psychiatric Hospitalizations: Yes, most recently at [facility] in April 2023   Previous Suicide Attempts: Yes, in 2016 via jumping in front of a moving vehicle   History of Violence: denies  Current / Recent Outpatient Psychiatrist: denies    Social History:  Marital Status: single  Children: 3 year old child who lives with their mother  Employment Status/Info: unemployed   Education: 8th grade  Special Ed: yes  : denies  Hoahaoism: denies  Housing Status: most recently [facility]   Hobbies/Leisure time: art   History of phys/sexual abuse: denies  Access to gun: denies     Family Psychiatric History: denies     Substance Abuse History:  Recreational Drugs: daily IV abuse of crystal meth and smoking cannabis over the past year ; denies other   Use of Alcohol: denies  Rehab History: denies    Tobacco Use: 1/2 PPD x 10 years (counseled on cessation)   Use of Caffeine: denies  Use of OTC: denies  Legal consequences of chemical use: yes    Legal History:  Past Charges/Incarcerations: yes  Pending charges: denies      Psychosocial Stressors: financial, occupational, and polysubstance abuse (crystal meth and cannabis)   Functioning Relationships: good relationship with sister   Strengths AND Liabilities: Strength: Patient accepts guidance/feedback, Strength: Patient is physically healthy., Liability: Patient has poor judgment, Liability: Patient is unstable., Liability: Patient lacks coping skills.      PAST MEDICAL & SURGICAL HISTORY   History reviewed. No pertinent  past medical history.  History reviewed. No pertinent surgical history.    NEUROLOGIC HISTORY  Seizures: denies    Head trauma with LOC: denies   CVA: denies     FAMILY HISTORY   History reviewed. No pertinent family history.    ALLERGIES   Review of patient's allergies indicates:  No Known Allergies    CURRENT MEDICATION REGIMEN   Home Meds:   Prior to Admission medications    Medication Sig Start Date End Date Taking? Authorizing Provider   benztropine (COGENTIN) 1 MG tablet Take 1 tablet (1 mg total) by mouth 2 (two) times daily. 4/7/23 5/7/23  Bhavani Saxena, PMGIOVANNYP   divalproex (DEPAKOTE) 250 MG EC tablet Take 1 tablet (250 mg total) by mouth once daily. 4/8/23 5/8/23  Bhavani Saxena, PMHNP   risperiDONE (RISPERDAL) 1 MG tablet Take 1 tablet (1 mg total) by mouth 2 (two) times daily. 4/7/23 5/7/23  Bhavani Saxena, PMHNP       OTC Meds: denies    Scheduled Meds:    PRN Meds:    Psychotherapeutics (From admission, onward)      None            LABORATORY DATA   Recent Results (from the past 72 hour(s))   CBC auto differential    Collection Time: 01/17/24 11:19 AM   Result Value Ref Range    WBC 7.04 3.90 - 12.70 K/uL    RBC 4.91 4.60 - 6.20 M/uL    Hemoglobin 14.2 14.0 - 18.0 g/dL    Hematocrit 42.0 40.0 - 54.0 %    MCV 86 82 - 98 fL    MCH 28.9 27.0 - 31.0 pg    MCHC 33.8 32.0 - 36.0 g/dL    RDW 12.8 11.5 - 14.5 %    Platelets 306 150 - 450 K/uL    MPV 9.2 9.2 - 12.9 fL    Immature Granulocytes 0.3 0.0 - 0.5 %    Gran # (ANC) 4.8 1.8 - 7.7 K/uL    Immature Grans (Abs) 0.02 0.00 - 0.04 K/uL    Lymph # 1.4 1.0 - 4.8 K/uL    Mono # 0.7 0.3 - 1.0 K/uL    Eos # 0.1 0.0 - 0.5 K/uL    Baso # 0.03 0.00 - 0.20 K/uL    nRBC 0 0 /100 WBC    Gran % 67.9 38.0 - 73.0 %    Lymph % 20.2 18.0 - 48.0 %    Mono % 9.8 4.0 - 15.0 %    Eosinophil % 1.4 0.0 - 8.0 %    Basophil % 0.4 0.0 - 1.9 %    Differential Method Automated    Comprehensive metabolic panel    Collection Time: 01/17/24 11:19 AM   Result Value Ref Range    Sodium 141 136  "- 145 mmol/L    Potassium 3.7 3.5 - 5.1 mmol/L    Chloride 104 95 - 110 mmol/L    CO2 26 23 - 29 mmol/L    Glucose 98 70 - 110 mg/dL    BUN 13 6 - 20 mg/dL    Creatinine 1.0 0.5 - 1.4 mg/dL    Calcium 9.8 8.7 - 10.5 mg/dL    Total Protein 7.6 6.0 - 8.4 g/dL    Albumin 4.4 3.5 - 5.2 g/dL    Total Bilirubin 0.6 0.1 - 1.0 mg/dL    Alkaline Phosphatase 76 55 - 135 U/L    AST 17 10 - 40 U/L    ALT 27 10 - 44 U/L    eGFR >60 >60 mL/min/1.73 m^2    Anion Gap 11 8 - 16 mmol/L   Ethanol    Collection Time: 01/17/24 11:19 AM   Result Value Ref Range    Alcohol, Serum <10 <10 mg/dL   Acetaminophen level    Collection Time: 01/17/24 11:19 AM   Result Value Ref Range    Acetaminophen (Tylenol), Serum <3.0 (L) 10.0 - 20.0 ug/mL      No results found for: "PHENYTOIN", "PHENOBARB", "VALPROATE", "CBMZ"      EXAMINATION    VITALS   Vitals:    01/17/24 1121   BP: 136/81   BP Location: Right arm   Pulse: 82   Resp: 18   Temp: 98.1 °F (36.7 °C)   TempSrc: Oral   SpO2: 100%        CONSTITUTIONAL  General Appearance: NAD, unremarkable, age appropriate, normal weight, disheveled, lying in bed, calm, guarded     MUSCULOSKELETAL  Muscle Strength and Tone: WNL    Abnormal Involuntary Movements: none observed   Gait and Station: WNL; non-ataxic     PSYCHIATRIC   Behavior/Cooperation:  cooperative, eye contact normal, calm, guarded   Speech:  normal rate, normal pitch, normal volume, monotone  Language: grossly intact, able to name, able to repeat with spontaneous speech  Mood: "OK I guess"  Affect:  constricted / guarded / paranoid   Associations: intact; no LORETTA  Thought Process: Linear   Thought Content: + paranoid delusional TC and + VH ; denies SI, HI, AH, or TH (no RIS observed)  Sensorium:  Awake  Alert and Oriented: to person, place, city, state, month, year, and somewhat to situation   Memory: 3/3 immediate, 2/3 at 5 minutes    Recent: Intact; able to report recent events   Remote: Limited ; Named 2/4 past presidents "   Attention/concentration: Intact. Appropriate for age/education. Able to spell w-[id number]-r-[id number]-d & d-l-r-o-w.   Similarities: Intact (difference between apple and orange?)  Abstract reasoning: Impaired / Limited   Fund of Knowledge: Named 2/4 past presidents   Insight: Impaired / Limited   Judgment: Impaired / Limited    CAM ICU Delirium Assessment - NEGATIVE    Is the patient aware of the biomedical complications associated with substance abuse and mental illness? yes      MEDICAL DECISION MAKING     ASSESSMENT         Unspecified Schizophrenia Spectrum and Other Psychotic Disorder (Unspecified Psychosis)   Unspecified Depressive Disorder   Unspecified Anxiety Disorder   Unspecified Insomnia   Methamphetamine Use Disorder, Severe, Dependence   Cannabis Abuse   Psychiatric Examination Requested by Authority ([facility]PD)   (Rule out SIPD / SIMD)         RECOMMENDATIONS       - With reasonable medical certainty, based on a present-state examination, the patient currently meets PEC criteria due to being gravely disabled 2/2 mental illness and/or substance abuse at this time.     - Once medically cleared / stable, seek inpatient psychiatric admission for further mental health treatment / stabilization.       - Defer scheduled psychiatric medication(s) to the inpatient psychiatric treatment team (assessing for improvement in psychosis / mood with time elapsed since last abuse of methamphetamines and cannabis) (discussed risks/benefits/alt vs no treatment with patient).     - Defer non-psychiatric medications / management to the ED MD.     - Can use Zyprexa 5 mg to 10 mg PO/IM q8 hours PRN for non-redirectable psychotic / manic agitation (discussed risks/benefits/alt vs no treatment with patient).       - Psychotherapy was performed with patient as noted above with a focus on improving psychosis (paranoid delusional TC and VH), depression, anxiety, sleep, and polysubstance cessation / total sobriety.     - Patient's most recent  resulted labs, imaging, and studies were reviewed today ; UDS pending ; Ethanol < 10 ; CBC and CMP were unremarkable      - Begin & continue suicide / violence / withdrawal precautions and continue to monitor the patient with a sitter while on a PEC / CEC.       - Thank you for this consult ; will continue to follow patient while at [facility]          Total time spent with patient and/or managing/coordinating patient's care today (excluding the time spent on psychotherapy): 62 minutes   Time spent on psychotherapy today (as noted above): 20 minutes   Total time for encounter today including psychotherapy: 82 minutes      More than 50% of the time was spent counseling/coordinating care.     Consulting clinician was informed of the encounter and consult note.       STAFF:  Mk Harper MD  [facility]   1/17/2024

## 2024-01-17 NOTE — ED NOTES
Pt belongings put in closet x1 bag  Includes:   Shirt jacket pants  Black phone  Pair of black slides  Zoe pack
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
## 2024-01-23 PROBLEM — F25.8 OTHER SCHIZOAFFECTIVE DISORDERS: Status: RESOLVED | Noted: 2023-04-07 | Resolved: 2024-01-23

## 2024-01-23 PROBLEM — F15.950 AMPHETAMINE AND PSYCHOSTIMULANT-INDUCED PSYCHOSIS WITH DELUSIONS: Status: ACTIVE | Noted: 2024-01-23

## 2025-01-17 ENCOUNTER — HOSPITAL ENCOUNTER (EMERGENCY)
Facility: HOSPITAL | Age: 30
Discharge: HOME | End: 2025-01-17
Attending: EMERGENCY MEDICINE

## 2025-01-17 VITALS
HEART RATE: 72 BPM | OXYGEN SATURATION: 100 % | TEMPERATURE: 97 F | SYSTOLIC BLOOD PRESSURE: 124 MMHG | RESPIRATION RATE: 16 BRPM | WEIGHT: 150 LBS | HEIGHT: 67 IN | BODY MASS INDEX: 23.54 KG/M2 | DIASTOLIC BLOOD PRESSURE: 74 MMHG

## 2025-01-17 DIAGNOSIS — L30.9 DERMATITIS: Primary | ICD-10-CM

## 2025-01-17 PROCEDURE — 99283 EMERGENCY DEPT VISIT LOW MDM: CPT

## 2025-01-17 PROCEDURE — 63600175 PHARM REV CODE 636 W HCPCS: Performed by: EMERGENCY MEDICINE

## 2025-01-17 PROCEDURE — 96372 THER/PROPH/DIAG INJ SC/IM: CPT | Performed by: EMERGENCY MEDICINE

## 2025-01-17 RX ORDER — DEXAMETHASONE SODIUM PHOSPHATE 4 MG/ML
8 INJECTION, SOLUTION INTRA-ARTICULAR; INTRALESIONAL; INTRAMUSCULAR; INTRAVENOUS; SOFT TISSUE
Status: DISCONTINUED | OUTPATIENT
Start: 2025-01-17 | End: 2025-01-17 | Stop reason: HOSPADM

## 2025-01-17 RX ORDER — TRIAMCINOLONE ACETONIDE 1 MG/G
CREAM TOPICAL 2 TIMES DAILY
Qty: 80 G | Refills: 0 | Status: SHIPPED | OUTPATIENT
Start: 2025-01-17

## 2025-01-18 NOTE — ED NOTES
Pt agitated and cursing at staff, refusing medications. Offered pt paper scrubs due to pt not having clothes with him and refused by continuing to curse at staff stating he wanted nothing from us

## 2025-01-18 NOTE — ED PROVIDER NOTES
Encounter Date: 1/17/2025       History     Chief Complaint   Patient presents with    Allergic Reaction     Generalized raised rash, unknown allergy, possible bed bugs. States where he was staying had them, ems gave 50mg benadryl    Foot Problem     Patient presents emergency department with reported rash to his trunk started yesterday he was given 50 mg of Benadryl EN to the emergency there was no apparent hives throat swelling no difficulty breathing thinks the rash maybe secondary to bug bites he is uncertain what bit him EMS was concerned he may have been bitten by bedbugs no other complaints no medications no new body wash or soaps        Review of patient's allergies indicates:  No Known Allergies  Past Medical History:   Diagnosis Date    Addiction to drug     ADHD (attention deficit hyperactivity disorder)     Hallucination     History of psychiatric hospitalization     Substance abuse      History reviewed. No pertinent surgical history.  No family history on file.  Social History     Tobacco Use    Smoking status: Every Day     Current packs/day: 1.00     Average packs/day: 1 pack/day for 12.0 years (12.0 ttl pk-yrs)     Types: Cigarettes     Start date: 2013    Smokeless tobacco: Never   Substance Use Topics    Alcohol use: Never    Drug use: Yes     Types: Amphetamines     Review of Systems   Constitutional:  Negative for chills and fever.   HENT:  Negative for sore throat.    Skin:  Positive for rash.   All other systems reviewed and are negative.      Physical Exam     Initial Vitals   BP Pulse Resp Temp SpO2   01/17/25 1907 01/17/25 1907 01/17/25 1907 01/17/25 1912 01/17/25 1907   (!) 150/95 80 16 97.3 °F (36.3 °C) 100 %      MAP       --                Physical Exam    Constitutional: He appears well-developed and well-nourished. No distress.   HENT:   Head: Normocephalic and atraumatic.   Neck: Neck supple.   Normal range of motion.  Cardiovascular:  Normal rate, regular rhythm, normal heart sounds  and intact distal pulses.           Pulmonary/Chest: Breath sounds normal. No respiratory distress. He has no wheezes.   Abdominal: Abdomen is soft. Bowel sounds are normal. He exhibits no mass. There is no abdominal tenderness.   Musculoskeletal:         General: Normal range of motion.      Cervical back: Normal range of motion and neck supple.     Neurological: He is alert and oriented to person, place, and time. GCS score is 15. GCS eye subscore is 4. GCS verbal subscore is 5. GCS motor subscore is 6.   Skin: Skin is warm and dry. Capillary refill takes less than 2 seconds. Rash noted.   Raised Erythematous rash to trunk no urticaria no petechia no purpura   Psychiatric: He has a normal mood and affect. His behavior is normal.         ED Course   Procedures  Labs Reviewed - No data to display       Imaging Results    None          Medications   dexAMETHasone injection 8 mg (8 mg Intramuscular Not Given 1/17/25 1943)     Medical Decision Making  Exam consistent with nonspecific dermatitis will treat Decadron in the emergency department topical steroids outpatient follow-up with [facility]  Prescription drug management.                                      Clinical Impression:  Final diagnoses:  [L30.9] Dermatitis (Primary)          ED Disposition Condition    Discharge Stable          ED Prescriptions       Medication Sig Dispense Start Date End Date Auth. Provider    triamcinolone acetonide 0.1% (KENALOG) 0.1 % cream Apply topically 2 (two) times daily. 80 g 1/17/2025 -- Hernan Cohen MD          Follow-up Information       Follow up With Specialties Details Why Contact Info    [facility]  Call in 1 day  [address] [place] [phone]  [phone]               Hernan Cohen MD  01/17/25 1955